# Patient Record
(demographics unavailable — no encounter records)

---

## 2025-03-13 NOTE — PHYSICAL EXAM
[General Appearance - Alert] : alert [General Appearance - In No Acute Distress] : in no acute distress [General Appearance - Well Nourished] : well nourished [General Appearance - Well Developed] : well developed [Ankle Swelling (On Exam)] : not present [Varicose Veins Of Lower Extremities] : not present [Delayed in the Right Toes] : capillary refills normal in right toes [Delayed in the Left Toes] : capillary refills normal in the left toes [FreeTextEntry3] : Normal vascular exam both feet. [No Joint Swelling] : no joint swelling [] : normal strength/tone [Normal Foot/Ankle] : Both lower extremities were exposed and visualized. Standing exam demonstrates normal foot posture and alignment. Hindfoot exam shows no hindfoot valgus or varus [FreeTextEntry1] : Left hallux nail thickened from prior injury.  Possible subungual hematoma.  Some fungal elements and onychomycosis noted on nail along with a crack of the nail left hallux. [Sensation] : the sensory exam was normal to light touch and pinprick [No Focal Deficits] : no focal deficits [Motor Exam] : the motor exam was normal [Deep Tendon Reflexes (DTR)] : deep tendon reflexes were 2+ and symmetric [Oriented To Time, Place, And Person] : oriented to person, place, and time [Impaired Insight] : insight and judgment were intact [Affect] : the affect was normal

## 2025-03-13 NOTE — ASSESSMENT
[FreeTextEntry1] : Patient is a  and has had issues with left hallux nail over the last year.  Recently she had a get performed on the left hallux.  Nail is cracked there is a contusion on the left hallux no sign of fracture on clinical exam.  Treatment consisted up careful removal of some of the overlying nail and debridement of the fungal toenail there is a mild hematoma.  Once proper removal of the nail was performed patient was visible.  Recommended patient wear a gauze pad or Band-Aid next few days if she is unable to play lacrosse comfortably we will do a procedure to remove the old toenail.  Patient left the office with minimal pain.  Follow-up as needed